# Patient Record
Sex: MALE | Race: BLACK OR AFRICAN AMERICAN | Employment: PART TIME | URBAN - METROPOLITAN AREA
[De-identification: names, ages, dates, MRNs, and addresses within clinical notes are randomized per-mention and may not be internally consistent; named-entity substitution may affect disease eponyms.]

---

## 2017-03-28 ENCOUNTER — HOSPITAL ENCOUNTER (EMERGENCY)
Age: 22
Discharge: HOME | End: 2017-03-28
Attending: EMERGENCY MEDICINE
Payer: SELF-PAY

## 2017-03-28 ENCOUNTER — APPOINTMENT (OUTPATIENT)
Dept: GENERAL RADIOLOGY | Age: 22
End: 2017-03-28
Attending: EMERGENCY MEDICINE
Payer: SELF-PAY

## 2017-03-28 VITALS
HEIGHT: 63 IN | SYSTOLIC BLOOD PRESSURE: 111 MMHG | DIASTOLIC BLOOD PRESSURE: 55 MMHG | TEMPERATURE: 99.4 F | RESPIRATION RATE: 16 BRPM | WEIGHT: 120 LBS | BODY MASS INDEX: 21.26 KG/M2 | HEART RATE: 103 BPM | OXYGEN SATURATION: 97 %

## 2017-03-28 DIAGNOSIS — B34.9 VIRAL ILLNESS: Primary | ICD-10-CM

## 2017-03-28 LAB
FLUAV AG NPH QL IA: NEGATIVE
FLUBV AG NOSE QL IA: NEGATIVE

## 2017-03-28 PROCEDURE — 99284 EMERGENCY DEPT VISIT MOD MDM: CPT

## 2017-03-28 PROCEDURE — 87804 INFLUENZA ASSAY W/OPTIC: CPT | Performed by: PHYSICIAN ASSISTANT

## 2017-03-28 PROCEDURE — 74011250637 HC RX REV CODE- 250/637: Performed by: PHYSICIAN ASSISTANT

## 2017-03-28 PROCEDURE — 74011250637 HC RX REV CODE- 250/637: Performed by: EMERGENCY MEDICINE

## 2017-03-28 PROCEDURE — 71020 XR CHEST PA LAT: CPT

## 2017-03-28 RX ORDER — IBUPROFEN 600 MG/1
600 TABLET ORAL
Status: COMPLETED | OUTPATIENT
Start: 2017-03-28 | End: 2017-03-28

## 2017-03-28 RX ORDER — ACETAMINOPHEN 500 MG
1000 TABLET ORAL
Status: COMPLETED | OUTPATIENT
Start: 2017-03-28 | End: 2017-03-28

## 2017-03-28 RX ADMIN — IBUPROFEN 600 MG: 600 TABLET, FILM COATED ORAL at 20:37

## 2017-03-28 RX ADMIN — ACETAMINOPHEN 1000 MG: 500 TABLET ORAL at 20:36

## 2017-03-28 NOTE — ED TRIAGE NOTES
The Sepsis Screening has been completed on arrival in the Emergency Department. Vital signs:  Patient Vitals for the past 4 hrs:   Temp Pulse Resp BP SpO2   03/28/17 1936 (!) 103.6 °F (39.8 °C) (!) 104 16 121/64 96 %            =monitored (data validate)  MAP (Calculated): 83    =calculated (manual entry)    Patient meets 2 or more SIRS criteria with suspected infection? YES    IF ANSWER IS YES, INITIATE NURSE DRIVEN SEPSIS ORDERS OR REQUEST SEPSIS BUNDLE ORDER BY PROVIDER.      SIRS Criteria is achieved when two or more of the following are present:    Temperature < 96.8°F (36°C) or > 100.9°F (38.3°C)   Heart Rate > 90 beats per minute   Respiratory Rate > 20 beats per minute

## 2017-03-29 NOTE — ED PROVIDER NOTES
HPI Comments: 24 yo M c/o fever, cough, and body aches x 3 days. Took guaifenesin earlier today without improvement in sx. Has associated headache. Denies earaches, nausea, vomiting, or diarrhea. No sick contacts. No other complaints. Patient is a 25 y.o. male presenting with headaches and cough. Headache      Cough   Associated symptoms include headaches. Past Medical History:   Diagnosis Date    Asthma        Past Surgical History:   Procedure Laterality Date    HX FEMUR FRACTURE TX      right    HX ORTHOPAEDIC      femur         History reviewed. No pertinent family history. Social History     Social History    Marital status: SINGLE     Spouse name: N/A    Number of children: N/A    Years of education: N/A     Occupational History    Not on file. Social History Main Topics    Smoking status: Never Smoker    Smokeless tobacco: Never Used    Alcohol use No    Drug use: No    Sexual activity: Not on file     Other Topics Concern    Not on file     Social History Narrative         ALLERGIES: Review of patient's allergies indicates no known allergies. Review of Systems   Respiratory: Positive for cough. Neurological: Positive for headaches. All other systems reviewed and are negative. Vitals:    03/28/17 1936 03/28/17 2056   BP: 121/64    Pulse: (!) 104    Resp: 16    Temp: (!) 103.6 °F (39.8 °C) (!) 102.8 °F (39.3 °C)   SpO2: 96%    Weight: 54.4 kg (120 lb)    Height: 5' 3\" (1.6 m)             Physical Exam   Constitutional: He is oriented to person, place, and time. He appears well-developed and well-nourished. No distress. HENT:   Head: Normocephalic and atraumatic. Eyes: Conjunctivae are normal.   Neck: Normal range of motion. Neck supple. No rigidity. No Brudzinski's sign noted. Cardiovascular: Normal rate, regular rhythm and normal heart sounds. Pulmonary/Chest: Effort normal and breath sounds normal. No respiratory distress. He has no wheezes.  He has no rales.   Musculoskeletal: Normal range of motion. Neurological: He is alert and oriented to person, place, and time. Skin: Skin is warm and dry. Psychiatric: He has a normal mood and affect. His behavior is normal. Judgment and thought content normal.   Nursing note and vitals reviewed. MDM  Number of Diagnoses or Management Options  Viral illness:     ED Course       Procedures    -------------------------------------------------------------------------------------------------------------------     EKG INTERPRETATIONS:      RADIOLOGY RESULTS:   XR CHEST PA LAT    (Results Pending)       LABORATORY RESULTS:  Recent Results (from the past 12 hour(s))   INFLUENZA A & B AG (RAPID TEST)    Collection Time: 03/28/17  8:10 PM   Result Value Ref Range    Influenza A Antigen NEGATIVE  NEG      Influenza B Antigen NEGATIVE  NEG             CONSULTATIONS:        PROGRESS NOTES:    9:01 PM Pt well appearing and in NAD. Here with cough and fever suggestive of viral process. Flu negative. cxr without acute process. Will d/h with symptomatic treatment. PCP f/u.     9:19 PM Informed by RN  on recheck of vitals. Offered IVF and labs, but pt does not want IV or blood draw. He agrees to PO intake of water and will recheck in 30 min. Lengthy D/W pt regarding possible worsening of pt's condition, need for follow up and strict ED return instructions for any worsening symptoms. DISPOSITION:  ED DIAGNOSIS & DISPOSITION INFORMATION  Diagnosis:   1.  Viral illness          Disposition: home    Follow-up Information     Follow up With Details Comments Tamiko Warner NP In 2 days For further evaluation [phone] EMERGENCY DEPT  Immediately if symptoms worsen [address]  [phone]          Patient's Medications   Start Taking    No medications on file   Continue Taking    ALBUTEROL (PROVENTIL HFA, VENTOLIN HFA) 90 MCG/ACTUATION INHALER    Take 2 Puffs by inhalation every four (4) hours as needed.      These Medications have changed    No medications on file   Stop Taking    No medications on file

## 2017-03-29 NOTE — DISCHARGE INSTRUCTIONS

## 2017-03-29 NOTE — ED NOTES
I have reviewed discharge instructions with the patient. The patient verbalized understanding. Patient armband removed and shredded. VSS.  Patient verbalized understanding of monitoring fever at home and taking tylenol and motrin as needed

## 2017-03-29 NOTE — ED NOTES
TONYA Franco called to bedside when taking patients vital signs prior to discharge due to HR being 125-130. Carlo Smith at bedside stated we will start a PIV and fluid bolus. Patient stated he refuses IV and we will do PO water at this time per patient agreeing and Ryan Zhong. PA verbalized okay.